# Patient Record
Sex: FEMALE | Race: WHITE | NOT HISPANIC OR LATINO | Employment: UNEMPLOYED | URBAN - NONMETROPOLITAN AREA
[De-identification: names, ages, dates, MRNs, and addresses within clinical notes are randomized per-mention and may not be internally consistent; named-entity substitution may affect disease eponyms.]

---

## 2023-04-21 ENCOUNTER — OFFICE VISIT (OUTPATIENT)
Dept: SURGERY | Facility: CLINIC | Age: 58
End: 2023-04-21
Payer: MEDICAID

## 2023-04-21 VITALS
WEIGHT: 146 LBS | HEIGHT: 62 IN | DIASTOLIC BLOOD PRESSURE: 88 MMHG | HEART RATE: 87 BPM | SYSTOLIC BLOOD PRESSURE: 124 MMHG | BODY MASS INDEX: 26.87 KG/M2

## 2023-04-21 DIAGNOSIS — Z12.11 SCREENING FOR COLON CANCER: Primary | ICD-10-CM

## 2023-04-21 RX ORDER — BISACODYL 5 MG/1
5 TABLET, DELAYED RELEASE ORAL TAKE AS DIRECTED
Qty: 4 TABLET | Refills: 0 | Status: SHIPPED | OUTPATIENT
Start: 2023-04-21 | End: 2024-04-20

## 2023-04-21 RX ORDER — POLYETHYLENE GLYCOL 3350 17 G/17G
POWDER, FOR SOLUTION ORAL
Qty: 238 G | Refills: 0 | Status: SHIPPED | OUTPATIENT
Start: 2023-04-21

## 2023-04-21 RX ORDER — FLUTICASONE PROPIONATE 50 MCG
SPRAY, SUSPENSION (ML) NASAL
COMMUNITY
Start: 2023-03-08

## 2023-04-21 RX ORDER — VENLAFAXINE HYDROCHLORIDE 37.5 MG/1
CAPSULE, EXTENDED RELEASE ORAL
COMMUNITY
Start: 2023-03-01

## 2023-04-21 RX ORDER — CETIRIZINE HYDROCHLORIDE 10 MG/1
TABLET ORAL
COMMUNITY
Start: 2023-04-12

## 2023-04-21 RX ORDER — GABAPENTIN 300 MG/1
CAPSULE ORAL
COMMUNITY
Start: 2023-03-15

## 2023-04-21 RX ORDER — CYCLOBENZAPRINE HCL 10 MG
TABLET ORAL
COMMUNITY
Start: 2023-04-05

## 2023-04-21 RX ORDER — CELECOXIB 200 MG/1
CAPSULE ORAL
COMMUNITY
Start: 2023-03-02

## 2023-04-21 NOTE — H&P (VIEW-ONLY)
Subjective   Rekha Jha is a 58 y.o. female who presents today for Initial Evaluation    Chief Complaint:    Chief Complaint   Patient presents with   • Colonoscopy        History of Present Illness:    History of Present Illness Rekha is a 58-year-old female who presents for screening colonoscopy.  She reports that she has not had a colonoscopy in the past.  States that she has a bowel movement once to twice daily to sometimes every 2 to 3 days.  Denies any visualization of blood in her stool.  Denies any known family history of colon cancer.  She denies any unintentional weight loss.  Reports that her stools range from diarrhea to solid stools.  She does not take any blood thinning medications.    The following portions of the patient's history were reviewed and updated as appropriate: allergies, current medications, past family history, past medical history, past social history, past surgical history and problem list.    Past Medical History:  Past Medical History:   Diagnosis Date   • Anemia    • Cholecystitis    • Degenerative disc disease, lumbar    • Hearing impaired    • Heart palpitations    • Liver cyst    • Ovarian cyst    • PONV (postoperative nausea and vomiting)    • Seizures     last time  2006   • Umbilical hernia        Social History:  Social History     Socioeconomic History   • Marital status:    Tobacco Use   • Smoking status: Every Day     Packs/day: 0.25     Years: 40.00     Pack years: 10.00     Types: Cigarettes   • Tobacco comments:     quit 2 months   Vaping Use   • Vaping Use: Never used   Substance and Sexual Activity   • Alcohol use: No     Comment: socially    • Drug use: No   • Sexual activity: Defer       Family History:  Family History   Problem Relation Age of Onset   • Hypertension Mother    • Cancer Maternal Aunt    • Cancer Maternal Grandmother        Past Surgical History:  Past Surgical History:   Procedure Laterality Date   • ABDOMINAL SURGERY     • BREAST  SURGERY      cyst removal left breast   •  SECTION     •  SECTION WITH TUBAL     • OR LAPAROSCOPY SURG CHOLECYSTECTOMY N/A 2016    Procedure: CHOLECYSTECTOMY LAPAROSCOPIC;  Surgeon: Benjamin Leon MD;  Location: [facility];  Service: General   • SALIVARY GLAND EXCISION     • TONSILLECTOMY         Problem List:  Patient Active Problem List   Diagnosis   • Cholecystitis       Allergy:   Allergies   Allergen Reactions   • Ciprofloxacin Hcl Hives        Current Medications:   Current Outpatient Medications   Medication Sig Dispense Refill   • celecoxib (CeleBREX) 200 MG capsule      • cetirizine (zyrTEC) 10 MG tablet      • cyclobenzaprine (FLEXERIL) 10 MG tablet      • fluticasone (FLONASE) 50 MCG/ACT nasal spray      • gabapentin (NEURONTIN) 300 MG capsule      • SALINE MIST 0.65 % nasal spray      • venlafaxine XR (EFFEXOR-XR) 37.5 MG 24 hr capsule      • bisacodyl (Dulcolax) 5 MG EC tablet Take 1 tablet by mouth Take As Directed. 4 tablet 0   • polyethylene glycol (MIRALAX) 17 GM/SCOOP powder Take as directed for colonosocpy. 238 g 0     No current facility-administered medications for this visit.       Review of Systems:    Review of Systems   Constitutional: Negative for activity change.   HENT: Negative for congestion.    Eyes: Negative for blurred vision.   Respiratory: Negative for shortness of breath.    Cardiovascular: Negative for chest pain.   Gastrointestinal: Negative for abdominal pain and blood in stool.   Endocrine: Negative for cold intolerance.   Genitourinary: Negative for flank pain.   Musculoskeletal: Negative for arthralgias.   Skin: Negative for bruise.   Allergic/Immunologic: Negative for environmental allergies.   Neurological: Negative for confusion.   Hematological: Negative for adenopathy.   Psychiatric/Behavioral: Negative for agitation.         Physical Exam:   Physical Exam  Constitutional:       Appearance: Normal appearance.   HENT:      Head: Normocephalic  "and atraumatic.      Right Ear: External ear normal.      Left Ear: External ear normal.   Eyes:      Conjunctiva/sclera: Conjunctivae normal.      Pupils: Pupils are equal, round, and reactive to light.   Cardiovascular:      Rate and Rhythm: Normal rate and regular rhythm.      Pulses: Normal pulses.      Heart sounds: Normal heart sounds.   Pulmonary:      Effort: Pulmonary effort is normal.      Breath sounds: Normal breath sounds.   Abdominal:      General: Abdomen is flat. Bowel sounds are normal.      Palpations: Abdomen is soft.   Musculoskeletal:         General: Normal range of motion.      Cervical back: Normal range of motion and neck supple.   Skin:     General: Skin is warm and dry.      Capillary Refill: Capillary refill takes less than 2 seconds.   Neurological:      General: No focal deficit present.      Mental Status: She is alert and oriented to person, place, and time.   Psychiatric:         Mood and Affect: Mood normal.         Behavior: Behavior normal.         Vitals:  Blood pressure 124/88, pulse 87, height 157.5 cm (62.01\"), weight 66.2 kg (146 lb), not currently breastfeeding.   Body mass index is 26.7 kg/m².         Assessment & Plan   Diagnoses and all orders for this visit:    1. Screening for colon cancer (Primary)  -     Case Request; Standing  -     Case Request    Other orders  -     Follow Anesthesia Guidelines / Protocol; Future  -     Obtain Informed Consent; Future  -     Provide NPO Instructions to Patient; Future  -     Chlorhexidine Skin Prep; Future  -     Follow Anesthesia Guidelines / Protocol; Standing  -     Verify / Perform Chlorhexidine Skin Prep; Standing  -     Verify / Perform Chlorhexidine Skin Prep if Indicated (If Not Already Completed); Standing  -     polyethylene glycol (MIRALAX) 17 GM/SCOOP powder; Take as directed for colonosocpy.  Dispense: 238 g; Refill: 0  -     bisacodyl (Dulcolax) 5 MG EC tablet; Take 1 tablet by mouth Take As Directed.  Dispense: 4 " tablet; Refill: 0      Rekha is a 58-year-old female presents for screening colonoscopy.  She will undergo colonoscopy with Dr. Schulte.  Verbalized understanding of prep instructions and procedure and wished to proceed.    Visit Diagnoses:    ICD-10-CM ICD-9-CM   1. Screening for colon cancer  Z12.11 V76.51         MEDS ORDERED DURING VISIT:  New Medications Ordered This Visit   Medications   • polyethylene glycol (MIRALAX) 17 GM/SCOOP powder     Sig: Take as directed for colonosocpy.     Dispense:  238 g     Refill:  0   • bisacodyl (Dulcolax) 5 MG EC tablet     Sig: Take 1 tablet by mouth Take As Directed.     Dispense:  4 tablet     Refill:  0       Return for Follow up post-op.             This document has been electronically signed by VITA Estrella  April 21, 2023 14:56 EDT    Please note that portions of this note were completed with a voice recognition program.

## 2023-04-21 NOTE — PROGRESS NOTES
Subjective   Rekha Jha is a 58 y.o. female who presents today for Initial Evaluation    Chief Complaint:    Chief Complaint   Patient presents with   • Colonoscopy        History of Present Illness:    History of Present Illness Rekha is a 58-year-old female who presents for screening colonoscopy.  She reports that she has not had a colonoscopy in the past.  States that she has a bowel movement once to twice daily to sometimes every 2 to 3 days.  Denies any visualization of blood in her stool.  Denies any known family history of colon cancer.  She denies any unintentional weight loss.  Reports that her stools range from diarrhea to solid stools.  She does not take any blood thinning medications.    The following portions of the patient's history were reviewed and updated as appropriate: allergies, current medications, past family history, past medical history, past social history, past surgical history and problem list.    Past Medical History:  Past Medical History:   Diagnosis Date   • Anemia    • Cholecystitis    • Degenerative disc disease, lumbar    • Hearing impaired    • Heart palpitations    • Liver cyst    • Ovarian cyst    • PONV (postoperative nausea and vomiting)    • Seizures     last time  2006   • Umbilical hernia        Social History:  Social History     Socioeconomic History   • Marital status:    Tobacco Use   • Smoking status: Every Day     Packs/day: 0.25     Years: 40.00     Pack years: 10.00     Types: Cigarettes   • Tobacco comments:     quit 2 months   Vaping Use   • Vaping Use: Never used   Substance and Sexual Activity   • Alcohol use: No     Comment: socially    • Drug use: No   • Sexual activity: Defer       Family History:  Family History   Problem Relation Age of Onset   • Hypertension Mother    • Cancer Maternal Aunt    • Cancer Maternal Grandmother        Past Surgical History:  Past Surgical History:   Procedure Laterality Date   • ABDOMINAL SURGERY     • BREAST  SURGERY      cyst removal left breast   •  SECTION     •  SECTION WITH TUBAL     • [place] LAPAROSCOPY SURG CHOLECYSTECTOMY N/A 2016    Procedure: CHOLECYSTECTOMY LAPAROSCOPIC;  Surgeon: Benjamin Leon MD;  Location: [facility];  Service: General   • SALIVARY GLAND EXCISION     • TONSILLECTOMY         Problem List:  Patient Active Problem List   Diagnosis   • Cholecystitis       Allergy:   Allergies   Allergen Reactions   • Ciprofloxacin Hcl Hives        Current Medications:   Current Outpatient Medications   Medication Sig Dispense Refill   • celecoxib (CeleBREX) 200 MG capsule      • cetirizine (zyrTEC) 10 MG tablet      • cyclobenzaprine (FLEXERIL) 10 MG tablet      • fluticasone (FLONASE) 50 MCG/ACT nasal spray      • gabapentin (NEURONTIN) 300 MG capsule      • SALINE MIST 0.65 % nasal spray      • venlafaxine XR (EFFEXOR-XR) 37.5 MG 24 hr capsule      • bisacodyl (Dulcolax) 5 MG EC tablet Take 1 tablet by mouth Take As Directed. 4 tablet 0   • polyethylene glycol (MIRALAX) 17 GM/SCOOP powder Take as directed for colonosocpy. 238 g 0     No current facility-administered medications for this visit.       Review of Systems:    Review of Systems   Constitutional: Negative for activity change.   HENT: Negative for congestion.    Eyes: Negative for blurred vision.   Respiratory: Negative for shortness of breath.    Cardiovascular: Negative for chest pain.   Gastrointestinal: Negative for abdominal pain and blood in stool.   Endocrine: Negative for cold intolerance.   Genitourinary: Negative for flank pain.   Musculoskeletal: Negative for arthralgias.   Skin: Negative for bruise.   Allergic/Immunologic: Negative for environmental allergies.   Neurological: Negative for confusion.   Hematological: Negative for adenopathy.   Psychiatric/Behavioral: Negative for agitation.         Physical Exam:   Physical Exam  Constitutional:       Appearance: Normal appearance.   HENT:      Head: Normocephalic  "and atraumatic.      Right Ear: External ear normal.      Left Ear: External ear normal.   Eyes:      Conjunctiva/sclera: Conjunctivae normal.      Pupils: Pupils are equal, round, and reactive to light.   Cardiovascular:      Rate and Rhythm: Normal rate and regular rhythm.      Pulses: Normal pulses.      Heart sounds: Normal heart sounds.   Pulmonary:      Effort: Pulmonary effort is normal.      Breath sounds: Normal breath sounds.   Abdominal:      General: Abdomen is flat. Bowel sounds are normal.      Palpations: Abdomen is soft.   Musculoskeletal:         General: Normal range of motion.      Cervical back: Normal range of motion and neck supple.   Skin:     General: Skin is warm and dry.      Capillary Refill: Capillary refill takes less than 2 seconds.   Neurological:      General: No focal deficit present.      Mental Status: She is alert and oriented to person, place, and time.   Psychiatric:         Mood and Affect: Mood normal.         Behavior: Behavior normal.         Vitals:  Blood pressure 124/88, pulse 87, height 157.5 cm (62.01\"), weight 66.2 kg (146 lb), not currently breastfeeding.   Body mass index is 26.7 kg/m².         Assessment & Plan   Diagnoses and all orders for this visit:    1. Screening for colon cancer (Primary)  -     Case Request; Standing  -     Case Request    Other orders  -     Follow Anesthesia Guidelines / Protocol; Future  -     Obtain Informed Consent; Future  -     Provide NPO Instructions to Patient; Future  -     Chlorhexidine Skin Prep; Future  -     Follow Anesthesia Guidelines / Protocol; Standing  -     Verify / Perform Chlorhexidine Skin Prep; Standing  -     Verify / Perform Chlorhexidine Skin Prep if Indicated (If Not Already Completed); Standing  -     polyethylene glycol (MIRALAX) 17 GM/SCOOP powder; Take as directed for colonosocpy.  Dispense: 238 g; Refill: 0  -     bisacodyl (Dulcolax) 5 MG EC tablet; Take 1 tablet by mouth Take As Directed.  Dispense: 4 " tablet; Refill: 0      Rekha is a 58-year-old female presents for screening colonoscopy.  She will undergo colonoscopy with Dr. Schulte.  Verbalized understanding of prep instructions and procedure and wished to proceed.    Visit Diagnoses:    ICD-10-CM ICD-9-CM   1. Screening for colon cancer  Z12.11 V76.51         MEDS ORDERED DURING VISIT:  New Medications Ordered This Visit   Medications   • polyethylene glycol (MIRALAX) 17 GM/SCOOP powder     Sig: Take as directed for colonosocpy.     Dispense:  238 g     Refill:  0   • bisacodyl (Dulcolax) 5 MG EC tablet     Sig: Take 1 tablet by mouth Take As Directed.     Dispense:  4 tablet     Refill:  0       Return for Follow up post-op.             This document has been electronically signed by VITA Estrella  April 21, 2023 14:56 EDT    Please note that portions of this note were completed with a voice recognition program.

## 2023-04-24 ENCOUNTER — TELEPHONE (OUTPATIENT)
Dept: SURGERY | Facility: CLINIC | Age: 58
End: 2023-04-24
Payer: MEDICAID

## 2023-04-24 PROBLEM — Z12.11 SCREENING FOR COLON CANCER: Status: ACTIVE | Noted: 2023-04-24

## 2023-04-24 NOTE — TELEPHONE ENCOUNTER
SPOKE TO PATIENT WITH SCHEDULE FOR HER COLONOSCOPY:    BOWEL PREP MEDICATIONS HAVE BEEN SENT TO [facility] IN [place], [place].    2 DAY BOWEL PREP, PATIENT HAS INSTRUCTIONS.  DAY 1:  WED:  04/26/23  DAY 2: THUR 04/27/23    NOTHING TO EAT OR DRINK AFTER MIDNIGHT ON THUR 04/27/23    COLONOSCOPY: FRI 04/28/23 ARRIVE AT 11:30 AM AT [facility].  PLEASE HAVE AN ADULT  WITH YOU TO DRIVE YOU HOME AFTER THE PROCEDURE.    IF YOU HAVE ANY QUESTIONS, PLEASE CALL OUR OFFICE -[phone].    PATIENT VERBALIZED UNDERSTANDING AND AGREEMENT OF THE ABOVE SCHEDULE, DATE, TIME, LOCATION, PREP, PROCESS.

## 2023-04-28 ENCOUNTER — HOSPITAL ENCOUNTER (OUTPATIENT)
Facility: HOSPITAL | Age: 58
Setting detail: HOSPITAL OUTPATIENT SURGERY
Discharge: HOME | End: 2023-04-28
Attending: SURGERY | Admitting: SURGERY
Payer: MEDICAID

## 2023-04-28 ENCOUNTER — ANESTHESIA (OUTPATIENT)
Dept: PERIOP | Facility: HOSPITAL | Age: 58
End: 2023-04-28
Payer: MEDICAID

## 2023-04-28 ENCOUNTER — ANESTHESIA EVENT (OUTPATIENT)
Dept: PERIOP | Facility: HOSPITAL | Age: 58
End: 2023-04-28
Payer: MEDICAID

## 2023-04-28 VITALS
HEART RATE: 73 BPM | TEMPERATURE: 97.1 F | DIASTOLIC BLOOD PRESSURE: 92 MMHG | RESPIRATION RATE: 18 BRPM | HEIGHT: 62 IN | SYSTOLIC BLOOD PRESSURE: 138 MMHG | OXYGEN SATURATION: 95 % | WEIGHT: 138 LBS | BODY MASS INDEX: 25.4 KG/M2

## 2023-04-28 PROCEDURE — 25010000002 MIDAZOLAM PER 1 MG: Performed by: NURSE ANESTHETIST, CERTIFIED REGISTERED

## 2023-04-28 PROCEDURE — 25010000002 PROPOFOL 200 MG/20ML EMULSION: Performed by: NURSE ANESTHETIST, CERTIFIED REGISTERED

## 2023-04-28 RX ORDER — KETOROLAC TROMETHAMINE 30 MG/ML
30 INJECTION, SOLUTION INTRAMUSCULAR; INTRAVENOUS EVERY 6 HOURS PRN
Status: DISCONTINUED | OUTPATIENT
Start: 2023-04-28 | End: 2023-04-28 | Stop reason: HOSPADM

## 2023-04-28 RX ORDER — LIDOCAINE HYDROCHLORIDE 20 MG/ML
INJECTION, SOLUTION EPIDURAL; INFILTRATION; INTRACAUDAL; PERINEURAL AS NEEDED
Status: DISCONTINUED | OUTPATIENT
Start: 2023-04-28 | End: 2023-04-28 | Stop reason: SURG

## 2023-04-28 RX ORDER — ONDANSETRON 2 MG/ML
4 INJECTION INTRAMUSCULAR; INTRAVENOUS AS NEEDED
Status: DISCONTINUED | OUTPATIENT
Start: 2023-04-28 | End: 2023-04-28 | Stop reason: HOSPADM

## 2023-04-28 RX ORDER — MIDAZOLAM HYDROCHLORIDE 1 MG/ML
1 INJECTION INTRAMUSCULAR; INTRAVENOUS
Status: DISCONTINUED | OUTPATIENT
Start: 2023-04-28 | End: 2023-04-28 | Stop reason: HOSPADM

## 2023-04-28 RX ORDER — MEPERIDINE HYDROCHLORIDE 25 MG/ML
12.5 INJECTION INTRAMUSCULAR; INTRAVENOUS; SUBCUTANEOUS
Status: DISCONTINUED | OUTPATIENT
Start: 2023-04-28 | End: 2023-04-28 | Stop reason: HOSPADM

## 2023-04-28 RX ORDER — SODIUM CHLORIDE, SODIUM LACTATE, POTASSIUM CHLORIDE, CALCIUM CHLORIDE 600; 310; 30; 20 MG/100ML; MG/100ML; MG/100ML; MG/100ML
100 INJECTION, SOLUTION INTRAVENOUS ONCE AS NEEDED
Status: DISCONTINUED | OUTPATIENT
Start: 2023-04-28 | End: 2023-04-28 | Stop reason: HOSPADM

## 2023-04-28 RX ORDER — PROPOFOL 10 MG/ML
INJECTION, EMULSION INTRAVENOUS AS NEEDED
Status: DISCONTINUED | OUTPATIENT
Start: 2023-04-28 | End: 2023-04-28 | Stop reason: SURG

## 2023-04-28 RX ORDER — OXYCODONE HYDROCHLORIDE AND ACETAMINOPHEN 5; 325 MG/1; MG/1
1 TABLET ORAL ONCE AS NEEDED
Status: DISCONTINUED | OUTPATIENT
Start: 2023-04-28 | End: 2023-04-28 | Stop reason: HOSPADM

## 2023-04-28 RX ORDER — SODIUM CHLORIDE, SODIUM LACTATE, POTASSIUM CHLORIDE, CALCIUM CHLORIDE 600; 310; 30; 20 MG/100ML; MG/100ML; MG/100ML; MG/100ML
125 INJECTION, SOLUTION INTRAVENOUS ONCE
Status: COMPLETED | OUTPATIENT
Start: 2023-04-28 | End: 2023-04-28

## 2023-04-28 RX ORDER — IPRATROPIUM BROMIDE AND ALBUTEROL SULFATE 2.5; .5 MG/3ML; MG/3ML
3 SOLUTION RESPIRATORY (INHALATION) ONCE AS NEEDED
Status: DISCONTINUED | OUTPATIENT
Start: 2023-04-28 | End: 2023-04-28 | Stop reason: HOSPADM

## 2023-04-28 RX ORDER — SODIUM CHLORIDE 0.9 % (FLUSH) 0.9 %
10 SYRINGE (ML) INJECTION AS NEEDED
Status: DISCONTINUED | OUTPATIENT
Start: 2023-04-28 | End: 2023-04-28 | Stop reason: HOSPADM

## 2023-04-28 RX ORDER — SODIUM CHLORIDE 0.9 % (FLUSH) 0.9 %
10 SYRINGE (ML) INJECTION EVERY 12 HOURS SCHEDULED
Status: DISCONTINUED | OUTPATIENT
Start: 2023-04-28 | End: 2023-04-28 | Stop reason: HOSPADM

## 2023-04-28 RX ORDER — MIDAZOLAM HYDROCHLORIDE 1 MG/ML
INJECTION INTRAMUSCULAR; INTRAVENOUS AS NEEDED
Status: DISCONTINUED | OUTPATIENT
Start: 2023-04-28 | End: 2023-04-28 | Stop reason: SURG

## 2023-04-28 RX ORDER — FENTANYL CITRATE 50 UG/ML
50 INJECTION, SOLUTION INTRAMUSCULAR; INTRAVENOUS
Status: DISCONTINUED | OUTPATIENT
Start: 2023-04-28 | End: 2023-04-28 | Stop reason: HOSPADM

## 2023-04-28 RX ORDER — SODIUM CHLORIDE 9 MG/ML
40 INJECTION, SOLUTION INTRAVENOUS AS NEEDED
Status: DISCONTINUED | OUTPATIENT
Start: 2023-04-28 | End: 2023-04-28 | Stop reason: HOSPADM

## 2023-04-28 RX ADMIN — MIDAZOLAM HYDROCHLORIDE 2 MG: 1 INJECTION, SOLUTION INTRAMUSCULAR; INTRAVENOUS at 12:17

## 2023-04-28 RX ADMIN — PROPOFOL 50 MG: 10 INJECTION, EMULSION INTRAVENOUS at 12:22

## 2023-04-28 RX ADMIN — SODIUM CHLORIDE, POTASSIUM CHLORIDE, SODIUM LACTATE AND CALCIUM CHLORIDE: 600; 310; 30; 20 INJECTION, SOLUTION INTRAVENOUS at 12:17

## 2023-04-28 RX ADMIN — PROPOFOL 100 MG: 10 INJECTION, EMULSION INTRAVENOUS at 12:18

## 2023-04-28 RX ADMIN — PROPOFOL 50 MG: 10 INJECTION, EMULSION INTRAVENOUS at 12:32

## 2023-04-28 RX ADMIN — PROPOFOL 50 MG: 10 INJECTION, EMULSION INTRAVENOUS at 12:27

## 2023-04-28 RX ADMIN — LIDOCAINE HYDROCHLORIDE 60 MG: 20 INJECTION, SOLUTION EPIDURAL; INFILTRATION; INTRACAUDAL; PERINEURAL at 12:18

## 2023-04-28 NOTE — ANESTHESIA PREPROCEDURE EVALUATION
Anesthesia Evaluation     history of anesthetic complications: PONV  NPO Solid Status: > 8 hours  NPO Liquid Status: > 8 hours           Airway   Mallampati: II  TM distance: >3 FB  Neck ROM: full  No difficulty expected  Dental - normal exam   (+) poor dentition    Pulmonary - normal exam   (+) a smoker Current Smoked day of surgery,   Cardiovascular - normal exam        Neuro/Psych  (+) seizures,    GI/Hepatic/Renal/Endo    (+)   liver disease,     Musculoskeletal     Abdominal  - normal exam    Bowel sounds: normal.   Substance History      OB/GYN          Other                        Anesthesia Plan    ASA 3     general     intravenous induction     Anesthetic plan, risks, benefits, and alternatives have been provided, discussed and informed consent has been obtained with: patient.        CODE STATUS:

## 2023-04-28 NOTE — ANESTHESIA POSTPROCEDURE EVALUATION
Patient: Rekha Jha    Procedure Summary     Date: 04/28/23 Room / Location: [facility] OR  /  [facility] OR    Anesthesia Start: 1217 Anesthesia Stop: 1234    Procedure: COLONOSCOPY Diagnosis:       Screening for colon cancer      (Screening for colon cancer [Z12.11])    Surgeons: Domenic Schulte MD Provider: Shahzad Aamdo MD    Anesthesia Type: general ASA Status: 3          Anesthesia Type: general    Vitals  No vitals data found for the desired time range.          Post Anesthesia Care and Evaluation    Patient location during evaluation: PHASE II  Patient participation: complete - patient participated  Level of consciousness: awake and alert  Pain score: 1  Pain management: adequate    Airway patency: patent  Anesthetic complications: No anesthetic complications  PONV Status: controlled  Cardiovascular status: acceptable  Respiratory status: acceptable  Hydration status: acceptable

## 2024-06-18 ENCOUNTER — TRANSCRIBE ORDERS (OUTPATIENT)
Dept: ADMINISTRATIVE | Facility: HOSPITAL | Age: 59
End: 2024-06-18
Payer: MEDICAID

## 2024-06-18 DIAGNOSIS — M54.50 LEFT-SIDED LOW BACK PAIN WITHOUT SCIATICA, UNSPECIFIED CHRONICITY: Primary | ICD-10-CM

## 2024-06-29 ENCOUNTER — HOSPITAL ENCOUNTER (OUTPATIENT)
Dept: MRI IMAGING | Facility: HOSPITAL | Age: 59
Discharge: HOME | End: 2024-06-29
Payer: MEDICAID

## 2024-06-29 DIAGNOSIS — M54.50 LEFT-SIDED LOW BACK PAIN WITHOUT SCIATICA, UNSPECIFIED CHRONICITY: ICD-10-CM

## 2024-06-29 PROCEDURE — 72148 MRI LUMBAR SPINE W/O DYE: CPT

## 2024-12-02 ENCOUNTER — OFFICE VISIT (OUTPATIENT)
Dept: PULMONOLOGY | Facility: CLINIC | Age: 59
End: 2024-12-02
Payer: MEDICAID

## 2024-12-02 VITALS
BODY MASS INDEX: 29.44 KG/M2 | HEIGHT: 62 IN | SYSTOLIC BLOOD PRESSURE: 130 MMHG | HEART RATE: 89 BPM | OXYGEN SATURATION: 97 % | TEMPERATURE: 98.1 F | DIASTOLIC BLOOD PRESSURE: 76 MMHG | WEIGHT: 160 LBS

## 2024-12-02 DIAGNOSIS — F17.200 SMOKER: ICD-10-CM

## 2024-12-02 DIAGNOSIS — G47.33 OSA (OBSTRUCTIVE SLEEP APNEA): Primary | ICD-10-CM

## 2024-12-02 PROCEDURE — 99203 OFFICE O/P NEW LOW 30 MIN: CPT | Performed by: INTERNAL MEDICINE

## 2024-12-02 PROCEDURE — 1160F RVW MEDS BY RX/DR IN RCRD: CPT | Performed by: INTERNAL MEDICINE

## 2024-12-02 PROCEDURE — 1159F MED LIST DOCD IN RCRD: CPT | Performed by: INTERNAL MEDICINE

## 2024-12-02 NOTE — PROGRESS NOTES
Chief Complaint  Snoring and Fatigue    Rekha Jha is a 59 y.o. female who presents today to [facility] for Snoring and Fatigue.    HPI:   Patient is a very pleasant 59-year-old female referred to me for further evaluation of sleep disordered breathing/snoring and unrefreshing sleep.    Patient reported loud snoring, fragmented sleep, morning headache and excessive daytime sleepiness.  Sleepiness score is 9.    She goes to bed at around 9 and it takes about an hour a few before she would fall asleep.  She wakes up in the middle of the night and watch television for an hour or so.    She finally wakes up at 7:00 in the morning and feels unrefreshed.    She denies sleep attack, cataplexy, hypnagogic hallucination and sleep paralysis.    She reported chronic sinus congestion.    He denies chest pain, orthopnea, PND, leg edema, nausea, vomiting, diarrhea, hemoptysis, hematemesis, melena, bright red blood per rectum.    She denies shortness of breath on exertion.        Previous History:   Past Medical History:   Diagnosis Date    Anemia     Cholecystitis     Degenerative disc disease, lumbar     Hearing impaired     Heart palpitations     Liver cyst     Ovarian cyst     PONV (postoperative nausea and vomiting)     Seizures     last time      Umbilical hernia       Past Surgical History:   Procedure Laterality Date    ABDOMINAL SURGERY      BREAST SURGERY      cyst removal left breast     SECTION       SECTION WITH TUBAL      COLONOSCOPY N/A 2023    Procedure: COLONOSCOPY;  Surgeon: Domenic Schulte MD;  Location: [facility];  Service: Gastroenterology;  Laterality: N/A;    DC LAPAROSCOPY SURG CHOLECYSTECTOMY N/A 2016    Procedure: CHOLECYSTECTOMY LAPAROSCOPIC;  Surgeon: Benjamin Leon MD;  Location: [facility] OR;  Service: General    SALIVARY GLAND EXCISION      TONSILLECTOMY        Social History     Socioeconomic History     "Marital status:    Tobacco Use    Smoking status: Every Day     Current packs/day: 0.25     Average packs/day: 0.3 packs/day for 40.0 years (10.0 ttl pk-yrs)     Types: Cigarettes    Smokeless tobacco: Never    Tobacco comments:     quit 2 months   Vaping Use    Vaping status: Never Used   Substance and Sexual Activity    Alcohol use: No     Comment: socially     Drug use: No    Sexual activity: Defer        Current Medications:  Current Outpatient Medications   Medication Sig Dispense Refill    celecoxib (CeleBREX) 200 MG capsule       cetirizine (zyrTEC) 10 MG tablet       cyclobenzaprine (FLEXERIL) 10 MG tablet       fluticasone (FLONASE) 50 MCG/ACT nasal spray       gabapentin (NEURONTIN) 300 MG capsule       SALINE MIST 0.65 % nasal spray       venlafaxine XR (EFFEXOR-XR) 37.5 MG 24 hr capsule        No current facility-administered medications for this visit.       Allergies:   Allergies   Allergen Reactions    Ciprofloxacin Hcl Hives       Vitals:   /76   Pulse 89   Temp 98.1 °F (36.7 °C)   Ht 157.5 cm (62\")   Wt 72.6 kg (160 lb)   LMP 07/28/2016 (Approximate)   SpO2 97%   BMI 29.26 kg/m²     Estimated body mass index is 29.26 kg/m² as calculated from the following:    Height as of this encounter: 157.5 cm (62\").    Weight as of this encounter: 72.6 kg (160 lb).    Rekha Jha  reports that she has been smoking cigarettes. She has a 10 pack-year smoking history. She has never used smokeless tobacco. I have educated her on the risk of diseases from using tobacco products such as cancer, COPD, and heart disease.     I advised her to quit and she is not willing to quit.    I spent 10 minutes counseling the patient.           Physical Exam:   Physical Exam  Vitals reviewed.   Constitutional:       Appearance: Normal appearance. She is obese.      Interventions: She is not intubated.  HENT:      Head: Normocephalic.      Nose: Nose normal.      Mouth/Throat:      Mouth: Mucous membranes " are moist.      Comments: Moderate oropharyngeal crowding.  Bilateral nasal congestion  Eyes:      Extraocular Movements: Extraocular movements intact.      Conjunctiva/sclera: Conjunctivae normal.      Pupils: Pupils are equal, round, and reactive to light.   Cardiovascular:      Rate and Rhythm: Normal rate.      Heart sounds: No murmur heard.     No friction rub. No gallop.   Pulmonary:      Effort: Pulmonary effort is normal. No tachypnea, bradypnea, accessory muscle usage, prolonged expiration, respiratory distress or retractions. She is not intubated.      Breath sounds: Normal breath sounds. No stridor, decreased air movement or transmitted upper airway sounds. No wheezing or rales.   Abdominal:      General: There is no distension.      Palpations: Abdomen is soft. There is no mass.      Tenderness: There is no abdominal tenderness. There is no right CVA tenderness, left CVA tenderness, guarding or rebound.      Hernia: No hernia is present.   Skin:     Coloration: Skin is not jaundiced.      Findings: No erythema.   Neurological:      General: No focal deficit present.      Mental Status: She is alert and oriented to person, place, and time.   Psychiatric:         Mood and Affect: Mood normal.          Procedures     STOP-Bang Score:     [place] Sleepiness Scale: [place] Sleepiness Scale  Sitting and reading: Moderate chance of dozing  Watching TV: Moderate chance of dozing  Sitting, inactive in a public place (e.g. a theatre or a meeting): Would never doze  As a passenger in a car for an hour without a break: Would never doze  Lying down to rest in the afternoon when circumstances permit: High chance of dozing  Sitting and talking to someone: Would never doze  Sitting quietly after a lunch without alcohol: Moderate chance of dozing  In a car, while stopped for a few minutes in traffic: Would never doze  Total score: 9     Lab Results:   No visits with results within 3 Month(s) from this visit.   Latest  "known visit with results is:   Admission on 08/18/2016, Discharged on 08/18/2016   Component Date Value Ref Range Status    Case Report 08/18/2016    Final                    Value:Surgical Pathology Report                         Case: [id number]                                  Authorizing Provider:  Benjamin Leon MD    Collected:           08/18/2016 11:09 AM          Ordering Location:     [facility]      Received:            08/18/2016 01:22 PM                                 OPERATING ROOM DEPARTMENT                                                    Pathologist:           Nelly Staples MD                                                        Specimen:    Gallbladder                                                                                Final Diagnosis 08/18/2016    Final                    Value:ssr       Lab Results (last 72 hours)       ** No results found for the last 72 hours. **          WBC No results found for: \"WBC\"   HGB No results found for: \"HGB\"   HCT No results found for: \"HCT\"   Platlets No results found for: \"LABPLAT\"     PT/INR:  No results found for: \"PROTIME\"/No results found for: \"INR\"    Sodium No results found for: \"NA\"   Potassium No results found for: \"K\"   Chloride No results found for: \"CL\"   Bicarbonate No results found for: \"PLASMABICARB\"   BUN No results found for: \"BUN\"   Creatinine No results found for: \"CREATININE\"   Calcium No results found for: \"CALCIUM\"   Magnesium No results found for: \"MG\"     pH No results found for: \"PHART\"   pO2 No results found for: \"PO2ART\"   pCO2 No results found for: \"ESL0LCM\"   HCO3 No results found for: \"XZW7GOY\"           Radiology Review:   No results found for this or any previous visit.     No results found for this or any previous visit.    No results found for this or any previous visit.    No results found for this or any previous visit.    No results found for this or any previous visit.    No results found " for this or any previous visit.     No results found for this or any previous visit.    No results found for this or any previous visit.    No results found for this or any previous visit.                  Results Review: I reviewed the patient's new clinical results.    Assessment and Plan    Visit Diagnosis:     ICD-10-CM ICD-9-CM   1. JASON (obstructive sleep apnea)  G47.33 327.23   I clinical suspicion for sleep apnea.  Will proceed with home sleep study.  Reassess after home sleep study.    Counseling for smoking cessation was done.        New Medications:   No orders of the defined types were placed in this encounter.      Discontinued Medications:   There are no discontinued medications.         Follow Up:       Patient was given instructions and counseling regarding her condition. Please see specific information pulled into the AVS if appropriate.       This document has been electronically signed by Fletcher Snow MD   December 2, 2024 11:16 EST    Dictated Utilizing Dragon Dictation: Part of this note may be an electronic transcription/translation of spoken language to printed text using the Dragon Dictation System.

## 2024-12-23 ENCOUNTER — OFFICE VISIT (OUTPATIENT)
Dept: ORTHOPEDIC SURGERY | Facility: CLINIC | Age: 59
End: 2024-12-23
Payer: MEDICAID

## 2024-12-23 ENCOUNTER — HOSPITAL ENCOUNTER (OUTPATIENT)
Dept: GENERAL RADIOLOGY | Facility: HOSPITAL | Age: 59
Discharge: HOME | End: 2024-12-23
Admitting: PHYSICIAN ASSISTANT
Payer: MEDICAID

## 2024-12-23 VITALS
DIASTOLIC BLOOD PRESSURE: 75 MMHG | SYSTOLIC BLOOD PRESSURE: 118 MMHG | HEART RATE: 71 BPM | WEIGHT: 160 LBS | BODY MASS INDEX: 29.44 KG/M2 | HEIGHT: 62 IN

## 2024-12-23 DIAGNOSIS — M25.531 BILATERAL WRIST PAIN: Primary | ICD-10-CM

## 2024-12-23 DIAGNOSIS — M25.532 BILATERAL WRIST PAIN: Primary | ICD-10-CM

## 2024-12-23 DIAGNOSIS — M25.532 BILATERAL WRIST PAIN: ICD-10-CM

## 2024-12-23 DIAGNOSIS — M25.531 BILATERAL WRIST PAIN: ICD-10-CM

## 2024-12-23 PROCEDURE — 73110 X-RAY EXAM OF WRIST: CPT

## 2024-12-23 PROCEDURE — 73110 X-RAY EXAM OF WRIST: CPT | Performed by: RADIOLOGY

## 2024-12-23 PROCEDURE — 99203 OFFICE O/P NEW LOW 30 MIN: CPT | Performed by: PHYSICIAN ASSISTANT

## 2024-12-23 RX ORDER — PANTOPRAZOLE SODIUM 40 MG/1
TABLET, DELAYED RELEASE ORAL
COMMUNITY
Start: 2024-12-17

## 2024-12-23 RX ORDER — DULOXETIN HYDROCHLORIDE 30 MG/1
CAPSULE, DELAYED RELEASE ORAL
COMMUNITY
Start: 2024-12-17

## 2024-12-23 NOTE — PROGRESS NOTES
[facility] Orthopaedic Surgery New Patient Visit          Patient: Rekha Jha  YOB: 1965  Date of Encounter: 2024  PCP: Katheryn Taylor DO      Subjective     Chief Complaint   Patient presents with    Right Wrist - Initial Evaluation, Pain, Numbness    Left Wrist - Initial Evaluation, Pain, Numbness           History of Present Illness:     Rekha Jha is a 59 y.o. female presents today as result of bilateral hand pain and numbness first 3 digits bilaterally.  The patient reports numbness and tingling as well as  strength loss.  She has difficulty upon normal daily activities as result of this.  She has undergone EMG/nerve conduction studies to which she presents today for results review.  Patient has undergone no bracing or medication.  She has been implementing activity modification with no alleviation.        Patient Active Problem List   Diagnosis    Cholecystitis    Screening for colon cancer     Past Medical History:   Diagnosis Date    Anemia     Cholecystitis     Degenerative disc disease, lumbar     Hearing impaired     Heart palpitations     Liver cyst     Patient states she hasnt been told    Ovarian cyst     PONV (postoperative nausea and vomiting)     Seizures     last time      Umbilical hernia      Past Surgical History:   Procedure Laterality Date    ABDOMINAL SURGERY      BREAST SURGERY      cyst removal left breast     SECTION       SECTION WITH TUBAL      COLONOSCOPY N/A 2023    Procedure: COLONOSCOPY;  Surgeon: Domenic Schulte MD;  Location: [facility] OR;  Service: Gastroenterology;  Laterality: N/A;    PA LAPAROSCOPY SURG CHOLECYSTECTOMY N/A 2016    Procedure: CHOLECYSTECTOMY LAPAROSCOPIC;  Surgeon: Benjamin Leon MD;  Location: [facility] OR;  Service: General    SALIVARY GLAND EXCISION      TONSILLECTOMY       Social History     Occupational History    Not on file   Tobacco Use    Smoking status: Every Day     Current  packs/day: 0.25     Average packs/day: 0.3 packs/day for 40.0 years (10.0 ttl pk-yrs)     Types: Cigarettes    Smokeless tobacco: Never    Tobacco comments:     quit 2 months   Vaping Use    Vaping status: Never Used   Substance and Sexual Activity    Alcohol use: No     Comment: socially     Drug use: No    Sexual activity: Defer    Rekha Jha  reports that she has been smoking cigarettes. She has a 10 pack-year smoking history. She has never used smokeless tobacco. I have educated her on the risk of diseases from using tobacco products such as cancer, COPD, and heart disease.     I advised her to quit and she is not willing to quit.    I spent 3  minutes counseling the patient.          Social History     Social History Narrative    Not on file     Family History   Adopted: Yes   Problem Relation Age of Onset    Hypertension Mother     Cancer Maternal Aunt     Cancer Maternal Grandmother      Current Outpatient Medications   Medication Sig Dispense Refill    celecoxib (CeleBREX) 200 MG capsule       cetirizine (zyrTEC) 10 MG tablet       DULoxetine (CYMBALTA) 30 MG capsule       fluticasone (FLONASE) 50 MCG/ACT nasal spray       gabapentin (NEURONTIN) 800 MG tablet Take 1 tablet by mouth Daily.      pantoprazole (PROTONIX) 40 MG EC tablet       SALINE MIST 0.65 % nasal spray       cyclobenzaprine (FLEXERIL) 10 MG tablet  (Patient not taking: Reported on 12/23/2024)      venlafaxine XR (EFFEXOR-XR) 37.5 MG 24 hr capsule  (Patient not taking: Reported on 12/23/2024)       No current facility-administered medications for this visit.     Allergies   Allergen Reactions    Ciprofloxacin Hcl Hives            Review of Systems   Constitutional: Negative.   HENT:  Positive for hearing loss.    Eyes: Negative.    Cardiovascular: Negative.    Respiratory: Negative.     Endocrine: Negative.    Hematologic/Lymphatic: Bruises/bleeds easily.   Skin: Negative.    Musculoskeletal:  Positive for back pain, joint pain, joint  "swelling and neck pain.        Pertinent positives listed in HPI   Gastrointestinal:  Positive for nausea and vomiting.   Genitourinary: Negative.    Neurological:  Positive for numbness.   Psychiatric/Behavioral:  Positive for depression.    Allergic/Immunologic: Negative.          Objective      Vitals:    12/23/24 1044   BP: 118/75   Pulse: 71   Weight: 72.6 kg (160 lb)   Height: 157.5 cm (62\")      BMI is >= 25 and <30. (Overweight) The following options were offered after discussion;: exercise counseling/recommendations and nutrition counseling/recommendations      Physical Exam  Vitals and nursing note reviewed.   Constitutional:       General: She is not in acute distress.     Appearance: She is not ill-appearing.   HENT:      Head: Normocephalic and atraumatic.      Right Ear: External ear normal.      Left Ear: External ear normal.      Nose: Nose normal. No congestion or rhinorrhea.   Eyes:      Extraocular Movements: Extraocular movements intact.      Conjunctiva/sclera: Conjunctivae normal.      Pupils: Pupils are equal, round, and reactive to light.   Cardiovascular:      Rate and Rhythm: Normal rate.      Pulses: Normal pulses.   Pulmonary:      Effort: Pulmonary effort is normal. No respiratory distress.      Breath sounds: No stridor.   Abdominal:      General: There is no distension.   Musculoskeletal:      Cervical back: Normal range of motion.      Comments: Examination today patient's bilateral upper extremities reveals hand numbness and altered sensation first 3 digits with positive Phalen's and Tinel sign.  No significant thenar atrophy.  No loss of intrinsic musculature.  Neurovascular status grossly intact bilateral upper extremities   Skin:     General: Skin is warm and dry.      Capillary Refill: Capillary refill takes less than 2 seconds.   Neurological:      General: No focal deficit present.      Mental Status: She is alert and oriented to person, place, and time.   Psychiatric:         " Mood and Affect: Mood normal.         Behavior: Behavior normal.         Thought Content: Thought content normal.         Judgment: Judgment normal.                 Radiology:        XR Wrist 3+ View Bilateral    Result Date: 12/23/2024  As Above.      This report was finalized on 12/23/2024 10:58 AM by Dr. Fredi Ibrahim MD.           Assessment/Plan        ICD-10-CM ICD-9-CM   1. Bilateral wrist pain  M25.531 719.43    M25.532      59-year-old female with notable bilateral hand numbness and tingling consistent and concerning for carpal tunnel syndrome.  Previous EMG and nerve conduction studies reveals abnormal study consistent with mild sensorimotor median nerve impingement consistent with carpal tunnel syndrome.  Very mild affecting the left.  Secondary to the notable findings consistent with carpal tunnel syndrome bilaterally the patient was provided today with bilateral cock-up wrist braces for nighttime splinting.  The patient was instructed to return back in 3 weeks for further evaluation of the efficacy of the conservative treatment options.                        This document was signed by Yosef Lucio PA-C December 23, 2024    CC: Katheryn Taylor DO      Dictated Utilizing Dragon Dictation:   Please note that portions of this note were completed with a voice recognition program.   Part of this note may be an electronic transcription/translation of spoken language to printed text using the Dragon Dictation System.

## 2025-01-02 DIAGNOSIS — G47.33 OSA (OBSTRUCTIVE SLEEP APNEA): Primary | ICD-10-CM

## 2025-01-10 DIAGNOSIS — G47.33 OSA (OBSTRUCTIVE SLEEP APNEA): Primary | ICD-10-CM

## 2025-01-14 ENCOUNTER — OFFICE VISIT (OUTPATIENT)
Dept: ORTHOPEDIC SURGERY | Facility: CLINIC | Age: 60
End: 2025-01-14
Payer: MEDICAID

## 2025-01-14 VITALS — WEIGHT: 160.05 LBS | HEIGHT: 62 IN | BODY MASS INDEX: 29.45 KG/M2

## 2025-01-14 DIAGNOSIS — G56.00 CARPAL TUNNEL SYNDROME, UNSPECIFIED LATERALITY: ICD-10-CM

## 2025-01-14 DIAGNOSIS — M25.531 BILATERAL WRIST PAIN: Primary | ICD-10-CM

## 2025-01-14 DIAGNOSIS — M25.532 BILATERAL WRIST PAIN: Primary | ICD-10-CM

## 2025-01-14 PROCEDURE — 99213 OFFICE O/P EST LOW 20 MIN: CPT | Performed by: PHYSICIAN ASSISTANT

## 2025-02-04 ENCOUNTER — OFFICE VISIT (OUTPATIENT)
Dept: PULMONOLOGY | Facility: CLINIC | Age: 60
End: 2025-02-04
Payer: MEDICAID

## 2025-02-04 VITALS
SYSTOLIC BLOOD PRESSURE: 122 MMHG | WEIGHT: 157.4 LBS | BODY MASS INDEX: 27.89 KG/M2 | OXYGEN SATURATION: 94 % | HEART RATE: 77 BPM | HEIGHT: 63 IN | TEMPERATURE: 97.5 F | DIASTOLIC BLOOD PRESSURE: 82 MMHG

## 2025-02-04 DIAGNOSIS — G47.33 OSA (OBSTRUCTIVE SLEEP APNEA): Primary | ICD-10-CM

## 2025-02-04 DIAGNOSIS — F17.200 SMOKER: ICD-10-CM

## 2025-02-04 PROCEDURE — 99213 OFFICE O/P EST LOW 20 MIN: CPT | Performed by: NURSE PRACTITIONER

## 2025-02-04 PROCEDURE — 1159F MED LIST DOCD IN RCRD: CPT | Performed by: NURSE PRACTITIONER

## 2025-02-04 PROCEDURE — 1160F RVW MEDS BY RX/DR IN RCRD: CPT | Performed by: NURSE PRACTITIONER

## 2025-02-04 RX ORDER — MONTELUKAST SODIUM 10 MG/1
TABLET ORAL DAILY
COMMUNITY

## 2025-02-04 NOTE — PROGRESS NOTES
"Chief Complaint  Sleep Apnea    Subjective          Rekha Oren Jha presents to [facility]ULMON[facility]ARE [facility]ICINE for   History of Present Illness    Ms. Jha is a 60 year old female with a medical history significant for anemia, seizures, and sleep apnea.    She presents today for follow up on sleep apnea.  She reports that she got set up with her cpap on Friday.  She reports that she has used it a few times and feels that she is doing okay with it.  She does tell me that she keeps pulling her mask off in the middle of the night.  She tells me that she has noticed that it does help her some.  She states that she is not falling asleep while driving and is not as sleepy during the day.  She is a current smoker.      Objective   Vital Signs:   /82   Pulse 77   Temp 97.5 °F (36.4 °C)   Ht 158.8 cm (62.5\")   Wt 71.4 kg (157 lb 6.4 oz)   SpO2 94%   BMI 28.33 kg/m²         Physical Exam    GENERAL APPEARANCE: Well developed, well nourished, alert and cooperative, and appears to be in no acute distress.    HEAD: normocephalic. Atraumatic.    EYES: PERRL, EOMI. Vision is grossly intact.    THROAT: Oral cavity and pharynx normal. No inflammation, swelling, exudate, or lesions.     NECK: Neck supple.  No thyromegaly.    CARDIAC: Normal S1 and S2. No S3, S4 or murmurs. Rhythm is regular.     RESPIRATORY:Bilateral air entry positive.  No wheezing, crackles or rhonchi noted.    GI: Positive bowel sounds. Soft, nondistended, nontender.     MUSCULOSKELETAL: No significant deformity or joint abnormality. No edema. Peripheral pulses intact. No varicosities.    NEUROLOGICAL: Strength and sensation symmetric and intact throughout.     PSYCHIATRIC: The mental examination revealed the patient was oriented to person, place, and time.       Estimated body mass index is 28.33 kg/m² as calculated from the following:    Height as of this encounter: 158.8 cm (62.5\").    Weight as of this " "encounter: 71.4 kg (157 lb 6.4 oz).        Result Review :   The following data was reviewed by: VITA Chun on 02/04/2025:         PFT:RE    Low dose lung cancer screening:NA    Previous chest imaging:NA    Alpha-1 antitrypsin screening:NA    STOP-Bang Score:   NA  [place] Sleepiness Scale:   NA      ABG:    pH No results found for: \"PHART\"   pO2 No results found for: \"PO2ART\"   pCO2 No results found for: \"VZH8KEF\"   HCO3 No results found for: \"MAR0ECU\"                      Assessment and Plan    Problem List Items Addressed This Visit    None  Visit Diagnoses       JASON (obstructive sleep apnea)    -  Primary    Smoker              Rekha Jha  reports that she has been smoking cigarettes. She has a 10 pack-year smoking history. She has been exposed to tobacco smoke. She has never used smokeless tobacco. I have educated her on the risk of diseases from using tobacco products such as cancer, COPD, and heart disease.     I advised her to quit and she is not willing to quit.      Home sleep study was notable for mild sleep apnea.  AHI 7.7.  She was set up with a cpap. She is using it nightly. She does tell me that she is pulling it off in the middle of the night.   She noted benefit from use.     Patient was educated on positive airway pressure treatment.  As per CMS guidelines, more than 4 hours on 70% of observed nights is considered adherence. Patient was strongly encouraged to use CPAP as much as possible during sleep as more CPAP use is equal to more benefit. Use of heated humidification in positive airway pressure treatment to improve the adherence to the device.  In case of claustrophobia, we will provide the patient cognitive behavioral therapy and desensitization. Oral appliances use will be discussed with the patient in case of mild to moderate sleep apnea or if the patient with severe disease fail positive airway pressure treatment.       The patient was extensively educated on the " consequences of untreated obstructive sleep apnea namely cardiovascular/metabolic disorder, neurocognitive deficit, daytime sleepiness, motor vehicle accidents, depression, mood disorders and reduced quality of life.  At the end of conversation, the patient voices understanding of the disease process and treatment modality.  Patient also understands the risk of untreated obstructive sleep apnea and benefit benefits of the treatment.    Counseling time was greater than 10 minutes.      I will see her back in  a few months with a compliance report.      Follow Up   Return in about 2 months (around 4/4/2025).  Patient was given instructions and counseling regarding her condition or for health maintenance advice. Please see specific information pulled into the AVS if appropriate.

## 2025-02-28 ENCOUNTER — TRANSCRIBE ORDERS (OUTPATIENT)
Dept: ADMINISTRATIVE | Facility: HOSPITAL | Age: 60
End: 2025-02-28
Payer: MEDICAID

## 2025-02-28 DIAGNOSIS — R59.9 SWELLING OF LYMPH NODES: Primary | ICD-10-CM

## 2025-03-05 ENCOUNTER — HOSPITAL ENCOUNTER (OUTPATIENT)
Dept: ULTRASOUND IMAGING | Facility: HOSPITAL | Age: 60
Discharge: HOME | End: 2025-03-05
Admitting: FAMILY MEDICINE
Payer: MEDICAID

## 2025-03-05 DIAGNOSIS — R59.9 SWELLING OF LYMPH NODES: ICD-10-CM

## 2025-03-05 PROCEDURE — 76536 US EXAM OF HEAD AND NECK: CPT

## 2025-04-17 ENCOUNTER — OFFICE VISIT (OUTPATIENT)
Dept: GASTROENTEROLOGY | Facility: CLINIC | Age: 60
End: 2025-04-17
Payer: MEDICAID

## 2025-04-17 VITALS
SYSTOLIC BLOOD PRESSURE: 140 MMHG | WEIGHT: 157.4 LBS | HEIGHT: 63 IN | DIASTOLIC BLOOD PRESSURE: 83 MMHG | HEART RATE: 83 BPM | BODY MASS INDEX: 27.89 KG/M2

## 2025-04-17 DIAGNOSIS — R10.13 EPIGASTRIC PAIN: ICD-10-CM

## 2025-04-17 DIAGNOSIS — K21.9 GASTROESOPHAGEAL REFLUX DISEASE, UNSPECIFIED WHETHER ESOPHAGITIS PRESENT: Primary | ICD-10-CM

## 2025-04-17 DIAGNOSIS — R13.19 ESOPHAGEAL DYSPHAGIA: ICD-10-CM

## 2025-04-17 DIAGNOSIS — K58.2 IRRITABLE BOWEL SYNDROME WITH BOTH CONSTIPATION AND DIARRHEA: ICD-10-CM

## 2025-04-17 DIAGNOSIS — D50.9 IRON DEFICIENCY ANEMIA, UNSPECIFIED IRON DEFICIENCY ANEMIA TYPE: ICD-10-CM

## 2025-04-17 PROCEDURE — 83550 IRON BINDING TEST: CPT

## 2025-04-17 PROCEDURE — 83540 ASSAY OF IRON: CPT

## 2025-04-17 PROCEDURE — 85025 COMPLETE CBC W/AUTO DIFF WBC: CPT

## 2025-04-17 PROCEDURE — 82728 ASSAY OF FERRITIN: CPT

## 2025-04-17 RX ORDER — CALCIUM POLYCARBOPHIL 625 MG
625 TABLET ORAL 2 TIMES DAILY
Qty: 60 TABLET | Refills: 5 | Status: SHIPPED | OUTPATIENT
Start: 2025-04-17

## 2025-04-17 RX ORDER — PANTOPRAZOLE SODIUM 40 MG/1
40 TABLET, DELAYED RELEASE ORAL
Qty: 60 TABLET | Refills: 5 | Status: SHIPPED | OUTPATIENT
Start: 2025-04-17

## 2025-04-17 NOTE — PROGRESS NOTES
"Date of Consultation:  4/17/2025  Referring Physician: Katheryn Bass DO    Chief Complaint  GERD    Rekha Jha is a 60 y.o. female who presents today to [facility] for GERD.    HPI:   60-year-old female who presents today for evaluation of GERD.  Patient states that for the last 5-6 years, she has had severe acid reflux.  Patient notes vomiting stomach acid multiple times per week.  States the vomitus consist of clear, foamy, thin liquid.  States that this typically happens after eating.  She has noticed that this is worse with eating fatty, fried, greasy, and spicy foods.  She has significantly reduced her intake at these.  In the past, patient was drinking 5-6 cans of Coca-Cola per day.  She has reduced this to 1 can per day.  She does not consume any other caffeine.  She is currently on Protonix 40 mg daily and Nexium daily.  She notes having epigastric tenderness.  States that this feels like she is \"constantly full all the time.\"  She has some mild lower abdominal cramping as well.  She notes some dysphagia to onions and tomatoes.  Patient feels like she has issues in her upper esophagus with swallowing.  Patient has had issues with choking the past but not in recent months.  In the past, patient was on Ozempic for short period of time.  She notes currently having 2 bowel movements per day that she rates as BSS 1-6.  She has poor evacuation of her colon and excessive straining sometimes.  She denies unintentional weight loss, nausea, vomiting, melena, or hematochezia.    Of note, patient reports history of anemia.  Most recent labs available for my review collected in 2016.  This is notable for H&H 9.4/32.0 with MCV 77.7.  Patient states that she has been unable to tolerate oral iron supplementation in the past.  States that she has been anemic on recent labs.  However, I do not have these labs available for review.  Patient had colonoscopy performed " by Dr. Schulte in 2023.  This was notable for multiple small and large mouth diverticula, nonbleeding internal hemorrhoids, otherwise unremarkable.  Repeat colonoscopy recommended in 10 years for screening purposes.               Previous History:   Past Medical History:   Diagnosis Date    Anemia     Cholecystitis     Degenerative disc disease, lumbar     Hearing impaired     Heart palpitations     Liver cyst     Patient states she hasnt been told    Ovarian cyst     PONV (postoperative nausea and vomiting)     Seizures     last time      Sleep apnea     Umbilical hernia       Past Surgical History:   Procedure Laterality Date    ABDOMINAL SURGERY      BREAST SURGERY      cyst removal left breast     SECTION       SECTION WITH TUBAL      COLONOSCOPY N/A 2023    Procedure: COLONOSCOPY;  Surgeon: Domenic Schulte MD;  Location: [facility];  Service: Gastroenterology;  Laterality: N/A;    MT LAPAROSCOPY SURG CHOLECYSTECTOMY N/A 2016    Procedure: CHOLECYSTECTOMY LAPAROSCOPIC;  Surgeon: Benjamin Leon MD;  Location: [facility];  Service: General    SALIVARY GLAND EXCISION      TONSILLECTOMY        Social History     Socioeconomic History    Marital status:    Tobacco Use    Smoking status: Every Day     Current packs/day: 0.25     Average packs/day: 0.3 packs/day for 40.0 years (10.0 ttl pk-yrs)     Types: Cigarettes     Passive exposure: Current    Smokeless tobacco: Never    Tobacco comments:     Down to a third of a pack per day    Vaping Use    Vaping status: Never Used   Substance and Sexual Activity    Alcohol use: No     Comment: socially     Drug use: No    Sexual activity: Defer        Current Medications:  Current Outpatient Medications   Medication Sig Dispense Refill    celecoxib (CeleBREX) 200 MG capsule       cetirizine (zyrTEC) 10 MG tablet       cyclobenzaprine (FLEXERIL) 10 MG tablet       DULoxetine (CYMBALTA) 30 MG capsule       fluticasone (FLONASE)  "50 MCG/ACT nasal spray       gabapentin (NEURONTIN) 800 MG tablet Take 1 tablet by mouth Daily.      montelukast (SINGULAIR) 10 MG tablet Daily.      pantoprazole (PROTONIX) 40 MG EC tablet Take 1 tablet by mouth 2 (Two) Times a Day Before Meals. 60 tablet 5    SALINE MIST 0.65 % nasal spray       venlafaxine XR (EFFEXOR-XR) 37.5 MG 24 hr capsule       polycarbophil (calcium polycarbophil) 625 MG tablet tablet Take 1 tablet by mouth 2 (Two) Times a Day. 60 tablet 5     No current facility-administered medications for this visit.       Allergies:   Allergies   Allergen Reactions    Ciprofloxacin Hcl Hives       Vitals:   /83   Pulse 83   Ht 158.8 cm (62.5\")   Wt 71.4 kg (157 lb 6.4 oz)   LMP 07/28/2016 (Approximate)   BMI 28.33 kg/m²   Estimated body mass index is 28.33 kg/m² as calculated from the following:    Height as of this encounter: 158.8 cm (62.5\").    Weight as of this encounter: 71.4 kg (157 lb 6.4 oz).    Rekha Jha  reports that she has been smoking cigarettes. She has a 10 pack-year smoking history. She has been exposed to tobacco smoke. She has never used smokeless tobacco. I have educated her on the risk of diseases from using tobacco products such as cancer, COPD, and heart disease.       ROS:   Review of Systems   Constitutional: Negative.    HENT: Negative.     Respiratory: Negative.     Cardiovascular: Negative.    Gastrointestinal:  Positive for abdominal pain, constipation, diarrhea, nausea and vomiting. Negative for abdominal distention, anal bleeding, blood in stool and rectal pain.   All other systems reviewed and are negative.       Physical Exam:   Physical Exam  Vitals reviewed.   Constitutional:       General: She is not in acute distress.     Appearance: Normal appearance. She is well-groomed. She is not toxic-appearing.   HENT:      Head: Normocephalic and atraumatic.      Mouth/Throat:      Mouth: Mucous membranes are moist.   Eyes:      Extraocular Movements: " Extraocular movements intact.   Cardiovascular:      Rate and Rhythm: Normal rate and regular rhythm.      Heart sounds: Normal heart sounds. No murmur heard.  Pulmonary:      Effort: Pulmonary effort is normal. No respiratory distress.      Breath sounds: Normal breath sounds. No stridor. No wheezing or rales.   Abdominal:      General: Bowel sounds are normal. There is no distension.      Palpations: Abdomen is soft. There is no mass.      Tenderness: There is no abdominal tenderness. There is no guarding or rebound.      Hernia: No hernia is present.   Skin:     General: Skin is warm.      Coloration: Skin is not jaundiced.      Findings: No rash.   Neurological:      Mental Status: She is alert and oriented to person, place, and time.   Psychiatric:         Mood and Affect: Mood and affect normal.         Speech: Speech normal.         Behavior: Behavior normal. Behavior is cooperative.         Thought Content: Thought content normal.          Lab Results:   Lab Results   Component Value Date    WBC 6.51 08/16/2016    HGB 9.4 (L) 08/16/2016    HCT 32.0 (L) 08/16/2016    MCV 77.7 (L) 08/16/2016    RDW 18.1 (H) 08/16/2016     08/16/2016    NEUTRORELPCT 61.6 08/16/2016    LYMPHORELPCT 27.3 08/16/2016    MONORELPCT 8.3 08/16/2016    EOSRELPCT 2.3 08/16/2016    BASORELPCT 0.3 08/16/2016    NEUTROABS 4.01 08/16/2016    LYMPHSABS 1.78 08/16/2016       Lab Results   Component Value Date     08/16/2016    K 4.3 08/16/2016    CO2 29.0 08/16/2016     08/16/2016    BUN 14 08/16/2016    CREATININE 0.69 08/16/2016    EGFRIFNONA 90 08/16/2016    GLUCOSE 93 08/16/2016    CALCIUM 8.9 08/16/2016    ALKPHOS 63 08/16/2016    AST 27 08/16/2016    ALT 22 08/16/2016    BILITOT 0.4 08/16/2016    ALBUMIN 4.10 08/16/2016    PROTEINTOT 7.0 08/16/2016       Pathology:        Endoscopy:        Imaging:   US Head Neck Soft Tissue  Result Date: 3/5/2025    Right neck probable lymph node measuring about 1 cm.  This report  was finalized on 3/5/2025 9:21 AM by Dr. Fredi Ibrahim MD.      XR Wrist 3+ View Bilateral  Result Date: 12/23/2024  As Above.      This report was finalized on 12/23/2024 10:58 AM by Dr. Fredi Ibrahim MD.          Results review: During today's encounter, all relevant clinical data has been reviewed.      Assessment and Plan    1. Gastroesophageal reflux disease, unspecified whether esophagitis present (Primary)  2.  Esophageal dysphagia  Discussed management for GERD: encouraged weight loss, HOB elevation at night, avoidance of meals 2-3 hours before bedtime, avoid trigger foods (caffeine, alcohol, chocolate, acidic/spicy foods e.g oranges/tomatoes), and encouraged smoking cessation  Will obtain esophagram due to intermittent dysphagia.  -     FL ESOPHAGRAM SINGLE CONTRAST; Future  Will initiate patient on Protonix 40 mg twice daily.  -     pantoprazole (PROTONIX) 40 MG EC tablet; Take 1 tablet by mouth 2 (Two) Times a Day Before Meals.  Dispense: 60 tablet; Refill: 5    3. Epigastric pain  -     H. Pylori Antigen, Stool - Stool, Per Rectum    4. Iron deficiency anemia, unspecified iron deficiency anemia type  Given history of DEWEY, will obtain the following lab work.  If patient continues to be DEWEY, will consider proceeding with EGD and initiating on IV iron infusions given intolerance of oral iron in the past.  -     CBC & Differential  -     Iron Profile  -     Ferritin  -     Comprehensive Metabolic Panel; Future    5. Irritable bowel syndrome with both constipation and diarrhea  -     polycarbophil (calcium polycarbophil) 625 MG tablet tablet; Take 1 tablet by mouth 2 (Two) Times a Day.  Dispense: 60 tablet; Refill: 5    New Medications:   New Medications Ordered This Visit   Medications    pantoprazole (PROTONIX) 40 MG EC tablet     Sig: Take 1 tablet by mouth 2 (Two) Times a Day Before Meals.     Dispense:  60 tablet     Refill:  5    polycarbophil (calcium polycarbophil) 625 MG tablet tablet     Sig: Take 1  tablet by mouth 2 (Two) Times a Day.     Dispense:  60 tablet     Refill:  5       Discontinued Medications:   Medications Discontinued During This Encounter   Medication Reason    esomeprazole (nexIUM) 20 MG capsule Not Efficacious    pantoprazole (PROTONIX) 40 MG EC tablet Reorder        Visit Diagnoses:    ICD-10-CM ICD-9-CM   1. Gastroesophageal reflux disease, unspecified whether esophagitis present  K21.9 530.81   2. Irritable bowel syndrome with both constipation and diarrhea  K58.2 564.1   3. Epigastric pain  R10.13 789.06   4. Iron deficiency anemia, unspecified iron deficiency anemia type  D50.9 280.9   5. Esophageal dysphagia  R13.19 787.29            Follow Up:   Return in about 3 months (around 7/17/2025).    The patient was in agreement with the plan and all questions were answered to patient's satisfaction.        This document has been electronically signed by Sandy Almanza PA-C   April 17, 2025 13:58 EDT    Dictated Utilizing Dragon Dictation: Part of this note may be an electronic transcription/translation of spoken language to printed text using the Dragon Dictation System.    CC:  DO Dru Craig Cassie A, DO

## 2025-04-18 LAB
BASOPHILS # BLD AUTO: 0 X10E3/UL (ref 0–0.2)
BASOPHILS NFR BLD AUTO: 0 %
EOSINOPHIL # BLD AUTO: 0.1 X10E3/UL (ref 0–0.4)
EOSINOPHIL NFR BLD AUTO: 2 %
ERYTHROCYTE [DISTWIDTH] IN BLOOD BY AUTOMATED COUNT: 12.7 % (ref 11.7–15.4)
FERRITIN SERPL-MCNC: 40 NG/ML (ref 15–150)
HCT VFR BLD AUTO: 46.2 % (ref 34–46.6)
HGB BLD-MCNC: 15.7 G/DL (ref 11.1–15.9)
IMM GRANULOCYTES # BLD AUTO: 0 X10E3/UL (ref 0–0.1)
IMM GRANULOCYTES NFR BLD AUTO: 0 %
IRON SATN MFR SERPL: 23 % (ref 15–55)
IRON SERPL-MCNC: 84 UG/DL (ref 27–159)
LYMPHOCYTES # BLD AUTO: 1.8 X10E3/UL (ref 0.7–3.1)
LYMPHOCYTES NFR BLD AUTO: 29 %
MCH RBC QN AUTO: 33.8 PG (ref 26.6–33)
MCHC RBC AUTO-ENTMCNC: 34 G/DL (ref 31.5–35.7)
MCV RBC AUTO: 99 FL (ref 79–97)
MONOCYTES # BLD AUTO: 0.5 X10E3/UL (ref 0.1–0.9)
MONOCYTES NFR BLD AUTO: 7 %
NEUTROPHILS # BLD AUTO: 3.8 X10E3/UL (ref 1.4–7)
NEUTROPHILS NFR BLD AUTO: 62 %
PLATELET # BLD AUTO: 284 X10E3/UL (ref 150–450)
RBC # BLD AUTO: 4.65 X10E6/UL (ref 3.77–5.28)
TIBC SERPL-MCNC: 362 UG/DL (ref 250–450)
UIBC SERPL-MCNC: 278 UG/DL (ref 131–425)
WBC # BLD AUTO: 6.3 X10E3/UL (ref 3.4–10.8)

## 2025-04-23 ENCOUNTER — RESULTS FOLLOW-UP (OUTPATIENT)
Dept: GASTROENTEROLOGY | Facility: CLINIC | Age: 60
End: 2025-04-23
Payer: MEDICAID

## 2025-05-16 ENCOUNTER — OFFICE VISIT (OUTPATIENT)
Dept: PULMONOLOGY | Facility: CLINIC | Age: 60
End: 2025-05-16
Payer: MEDICAID

## 2025-05-16 VITALS
HEIGHT: 63 IN | WEIGHT: 153.6 LBS | SYSTOLIC BLOOD PRESSURE: 124 MMHG | HEART RATE: 76 BPM | DIASTOLIC BLOOD PRESSURE: 76 MMHG | TEMPERATURE: 96.8 F | OXYGEN SATURATION: 95 % | BODY MASS INDEX: 27.21 KG/M2

## 2025-05-16 DIAGNOSIS — G47.33 OSA (OBSTRUCTIVE SLEEP APNEA): Primary | ICD-10-CM

## 2025-05-16 DIAGNOSIS — F17.200 SMOKER: ICD-10-CM

## 2025-05-16 PROCEDURE — 99213 OFFICE O/P EST LOW 20 MIN: CPT | Performed by: NURSE PRACTITIONER

## 2025-05-16 PROCEDURE — 1159F MED LIST DOCD IN RCRD: CPT | Performed by: NURSE PRACTITIONER

## 2025-05-16 PROCEDURE — 1160F RVW MEDS BY RX/DR IN RCRD: CPT | Performed by: NURSE PRACTITIONER

## 2025-05-16 NOTE — PROGRESS NOTES
"Chief Complaint  Sleep Apnea    Subjective          Rekha Jha presents to [facility] for   History of Present Illness    Ms. Jha is a 60-year-old female with a medical history significant for anemia, seizures and sleep apnea.    She presents today for follow-up on sleep apnea.  She reports that she is using her CPAP nightly and voices no complaints.  She notes benefit from use.    Objective   Vital Signs:   /76   Pulse 76   Temp 96.8 °F (36 °C)   Ht 158.8 cm (62.52\")   Wt 69.7 kg (153 lb 9.6 oz)   SpO2 95%   BMI 27.63 kg/m²         Physical Exam    GENERAL APPEARANCE: Well developed, well nourished, alert and cooperative, and appears to be in no acute distress.    HEAD: normocephalic. Atraumatic.    EYES: PERRL, EOMI. Vision is grossly intact.    THROAT: Oral cavity and pharynx normal. No inflammation, swelling, exudate, or lesions.     NECK: Neck supple.  No thyromegaly.    CARDIAC: Normal S1 and S2. No S3, S4 or murmurs. Rhythm is regular.     RESPIRATORY:Bilateral air entry positive.  No wheezing, crackles or rhonchi noted.    GI: Positive bowel sounds. Soft, nondistended, nontender.     MUSCULOSKELETAL: No significant deformity or joint abnormality. No edema. Peripheral pulses intact. No varicosities.    NEUROLOGICAL: Strength and sensation symmetric and intact throughout.     PSYCHIATRIC: The mental examination revealed the patient was oriented to person, place, and time.       Estimated body mass index is 27.63 kg/m² as calculated from the following:    Height as of this encounter: 158.8 cm (62.52\").    Weight as of this encounter: 69.7 kg (153 lb 9.6 oz).        Result Review :   The following data was reviewed by: VITA Chun on 05/16/2025:  Common labs          4/17/2025    09:07   Common Labs   WBC 6.3    Hemoglobin 15.7    Hematocrit 46.2    Platelets 284           PFT:NA    Low dose lung cancer " "screening:NA    Previous chest imaging:NA    Alpha-1 antitrypsin screening:NA    STOP-Bang Score:   NA  [place] Sleepiness Scale:   NA      ABG:    pH No results found for: \"PHART\"   pO2 No results found for: \"PO2ART\"   pCO2 No results found for: \"CQI9BLP\"   HCO3 No results found for: \"UMC0CKC\"                      Assessment and Plan    Problem List Items Addressed This Visit    None  Visit Diagnoses         JASON (obstructive sleep apnea)    -  Primary      Smoker            Rekha Jha  reports that she has been smoking cigarettes. She has a 10 pack-year smoking history. She has been exposed to tobacco smoke. She has never used smokeless tobacco. I have educated her on the risk of diseases from using tobacco products such as cancer, COPD, and heart disease.     I advised her to quit and she is not willing to quit.      Sleep study is notable for mild sleep apnea.  AHI is 7.7.  She is compliant with use of CPAP nightly.  She reports that she is doing better with wearing her mask all night and notes benefit from use.  Compliance report was reviewed.       Patient was educated on positive airway pressure treatment.  As per CMS guidelines, more than 4 hours on 70% of observed nights is considered adherence. Patient was strongly encouraged to use CPAP as much as possible during sleep as more CPAP use is equal to more benefit. Use of heated humidification in positive airway pressure treatment to improve the adherence to the device.  In case of claustrophobia, we will provide the patient cognitive behavioral therapy and desensitization. Oral appliances use will be discussed with the patient in case of mild to moderate sleep apnea or if the patient with severe disease fail positive airway pressure treatment.       The patient was extensively educated on the consequences of untreated obstructive sleep apnea namely cardiovascular/metabolic disorder, neurocognitive deficit, daytime sleepiness, motor vehicle accidents, " depression, mood disorders and reduced quality of life.  At the end of conversation, the patient voices understanding of the disease process and treatment modality.  Patient also understands the risk of untreated obstructive sleep apnea and benefit benefits of the treatment.    Counseling time was greater than 10 minutes.        Follow Up   Return in about 1 year (around 5/16/2026).  Patient was given instructions and counseling regarding her condition or for health maintenance advice. Please see specific information pulled into the AVS if appropriate.

## 2025-07-03 ENCOUNTER — HOSPITAL ENCOUNTER (EMERGENCY)
Facility: HOSPITAL | Age: 60
Discharge: HOME | End: 2025-07-03
Attending: STUDENT IN AN ORGANIZED HEALTH CARE EDUCATION/TRAINING PROGRAM
Payer: MEDICAID

## 2025-07-03 VITALS
BODY MASS INDEX: 26.26 KG/M2 | HEIGHT: 63 IN | TEMPERATURE: 98 F | WEIGHT: 148.2 LBS | DIASTOLIC BLOOD PRESSURE: 77 MMHG | OXYGEN SATURATION: 94 % | RESPIRATION RATE: 17 BRPM | HEART RATE: 78 BPM | SYSTOLIC BLOOD PRESSURE: 110 MMHG

## 2025-07-03 DIAGNOSIS — M79.89 LEG SWELLING: Primary | ICD-10-CM

## 2025-07-03 LAB
ALBUMIN SERPL-MCNC: 4.1 G/DL (ref 3.5–5.2)
ALBUMIN/GLOB SERPL: 1.6 G/DL
ALP SERPL-CCNC: 121 U/L (ref 39–117)
ALT SERPL W P-5'-P-CCNC: 22 U/L (ref 1–33)
ANION GAP SERPL CALCULATED.3IONS-SCNC: 9.1 MMOL/L (ref 5–15)
AST SERPL-CCNC: 25 U/L (ref 1–32)
BASOPHILS # BLD AUTO: 0.02 10*3/MM3 (ref 0–0.2)
BASOPHILS NFR BLD AUTO: 0.2 % (ref 0–1.5)
BILIRUB SERPL-MCNC: 0.4 MG/DL (ref 0–1.2)
BUN SERPL-MCNC: 14.6 MG/DL (ref 6–20)
BUN/CREAT SERPL: 23.9 (ref 7–25)
CALCIUM SPEC-SCNC: 9.2 MG/DL (ref 8.6–10.5)
CHLORIDE SERPL-SCNC: 107 MMOL/L (ref 98–107)
CO2 SERPL-SCNC: 26.9 MMOL/L (ref 22–29)
CREAT SERPL-MCNC: 0.61 MG/DL (ref 0.57–1)
CRP SERPL-MCNC: <0.3 MG/DL (ref 0–0.5)
DEPRECATED RDW RBC AUTO: 52.4 FL (ref 37–54)
EGFRCR SERPLBLD CKD-EPI 2021: 103.8 ML/MIN/1.73
EOSINOPHIL # BLD AUTO: 0.14 10*3/MM3 (ref 0–0.4)
EOSINOPHIL NFR BLD AUTO: 1.5 % (ref 0.3–6.2)
ERYTHROCYTE [DISTWIDTH] IN BLOOD BY AUTOMATED COUNT: 13.6 % (ref 12.3–15.4)
GLOBULIN UR ELPH-MCNC: 2.5 GM/DL
GLUCOSE SERPL-MCNC: 90 MG/DL (ref 65–99)
HCT VFR BLD AUTO: 47.8 % (ref 34–46.6)
HGB BLD-MCNC: 15.5 G/DL (ref 12–15.9)
HOLD SPECIMEN: NORMAL
HOLD SPECIMEN: NORMAL
IMM GRANULOCYTES # BLD AUTO: 0.04 10*3/MM3 (ref 0–0.05)
IMM GRANULOCYTES NFR BLD AUTO: 0.4 % (ref 0–0.5)
LYMPHOCYTES # BLD AUTO: 2 10*3/MM3 (ref 0.7–3.1)
LYMPHOCYTES NFR BLD AUTO: 21.8 % (ref 19.6–45.3)
MCH RBC QN AUTO: 33.8 PG (ref 26.6–33)
MCHC RBC AUTO-ENTMCNC: 32.4 G/DL (ref 31.5–35.7)
MCV RBC AUTO: 104.4 FL (ref 79–97)
MONOCYTES # BLD AUTO: 0.49 10*3/MM3 (ref 0.1–0.9)
MONOCYTES NFR BLD AUTO: 5.3 % (ref 5–12)
NEUTROPHILS NFR BLD AUTO: 6.5 10*3/MM3 (ref 1.7–7)
NEUTROPHILS NFR BLD AUTO: 70.8 % (ref 42.7–76)
NRBC BLD AUTO-RTO: 0 /100 WBC (ref 0–0.2)
NT-PROBNP SERPL-MCNC: <36 PG/ML (ref 0–900)
PLATELET # BLD AUTO: 299 10*3/MM3 (ref 140–450)
PMV BLD AUTO: 10 FL (ref 6–12)
POTASSIUM SERPL-SCNC: 4.5 MMOL/L (ref 3.5–5.2)
PROT SERPL-MCNC: 6.6 G/DL (ref 6–8.5)
RBC # BLD AUTO: 4.58 10*6/MM3 (ref 3.77–5.28)
SODIUM SERPL-SCNC: 143 MMOL/L (ref 136–145)
WBC NRBC COR # BLD AUTO: 9.19 10*3/MM3 (ref 3.4–10.8)
WHOLE BLOOD HOLD COAG: NORMAL
WHOLE BLOOD HOLD SPECIMEN: NORMAL

## 2025-07-03 PROCEDURE — 86140 C-REACTIVE PROTEIN: CPT

## 2025-07-03 PROCEDURE — 85025 COMPLETE CBC W/AUTO DIFF WBC: CPT

## 2025-07-03 PROCEDURE — 80053 COMPREHEN METABOLIC PANEL: CPT

## 2025-07-03 PROCEDURE — 36415 COLL VENOUS BLD VENIPUNCTURE: CPT

## 2025-07-03 PROCEDURE — 83880 ASSAY OF NATRIURETIC PEPTIDE: CPT

## 2025-07-03 PROCEDURE — 99283 EMERGENCY DEPT VISIT LOW MDM: CPT

## 2025-07-03 RX ORDER — METHYLPREDNISOLONE 4 MG/1
TABLET ORAL
Qty: 21 TABLET | Refills: 0 | Status: SHIPPED | OUTPATIENT
Start: 2025-07-03

## 2025-07-03 RX ORDER — FUROSEMIDE 20 MG/1
20 TABLET ORAL DAILY
Qty: 3 TABLET | Refills: 0 | Status: SHIPPED | OUTPATIENT
Start: 2025-07-03

## 2025-07-03 NOTE — ED NOTES
MEDICAL SCREENING:    Reason for Visit: bilateral leg swelling x 1 week    Patient initially seen in triage.  The patient was advised further evaluation and diagnostic testing will be needed, some of the treatment and testing will be initiated in the lobby in order to begin the process.  The patient will be returned to the waiting area for the time being and possibly be re-assessed by a subsequent ED provider.  The patient will be brought back to the treatment area in as timely manner as possible.       Kathie Otero, PADadaC  07/03/25 1404

## 2025-07-03 NOTE — ED PROVIDER NOTES
Subjective   History of Present Illness  60-year-old female with past medical history of anemia, degenerative disc disease, seizures, and sleep apnea presents to the emergency room with bilateral lower extremity swelling.  Patient states a few days ago she was moving furniture and since that time has noticed lower extremity swelling.  She also states that she thinks that she injured her ribs on the right therefore went to urgent care and had an x-ray and a Toradol shot and states she received another Toradol shot at the primary care physician's office yesterday and since that time has noticed increasing swelling in her ankles.  She states that she thinks that she may be having an allergic reaction however cannot be for certain.  She denies any history of congestive heart failure and denies any use of diuretics.  She denies any specific aggravating or alleviating factors.  Denies any other complaints or concerns at this time.    History provided by:  Patient   used: No        Review of Systems   Constitutional: Negative.  Negative for fever.   HENT: Negative.  Negative for trouble swallowing.    Respiratory: Negative.  Negative for shortness of breath.    Cardiovascular:  Positive for leg swelling. Negative for chest pain.   Gastrointestinal: Negative.  Negative for abdominal pain.   Endocrine: Negative.    Genitourinary: Negative.  Negative for dysuria.   Skin: Negative.  Negative for rash.   Neurological: Negative.    Psychiatric/Behavioral: Negative.     All other systems reviewed and are negative.      Past Medical History:   Diagnosis Date    Anemia     Cholecystitis     Degenerative disc disease, lumbar     Hearing impaired     Heart palpitations     Liver cyst     Patient states she hasnt been told    Ovarian cyst     PONV (postoperative nausea and vomiting)     Seizures     last time  2006    Sleep apnea     Umbilical hernia        Allergies   Allergen Reactions    Ciprofloxacin Hcl Hives        Past Surgical History:   Procedure Laterality Date    ABDOMINAL SURGERY      BREAST SURGERY      cyst removal left breast     SECTION       SECTION WITH TUBAL      COLONOSCOPY N/A 2023    Procedure: COLONOSCOPY;  Surgeon: Domenic Schulte MD;  Location:  [facility];  Service: Gastroenterology;  Laterality: N/A;    AK LAPAROSCOPY SURG CHOLECYSTECTOMY N/A 2016    Procedure: CHOLECYSTECTOMY LAPAROSCOPIC;  Surgeon: Benjamin Leon MD;  Location:  [facility];  Service: General    SALIVARY GLAND EXCISION      TONSILLECTOMY         Family History   Adopted: Yes   Problem Relation Age of Onset    Hypertension Mother     Cancer Maternal Aunt     Cancer Maternal Grandmother        Social History     Socioeconomic History    Marital status:    Tobacco Use    Smoking status: Every Day     Current packs/day: 0.25     Average packs/day: 0.3 packs/day for 40.0 years (10.0 ttl pk-yrs)     Types: Cigarettes     Passive exposure: Current    Smokeless tobacco: Never    Tobacco comments:     Down to a third of a pack per day    Vaping Use    Vaping status: Never Used   Substance and Sexual Activity    Alcohol use: No     Comment: socially     Drug use: No    Sexual activity: Defer           Objective   Physical Exam  Vitals and nursing note reviewed.   Constitutional:       General: She is not in acute distress.     Appearance: She is well-developed. She is not diaphoretic.   HENT:      Head: Normocephalic and atraumatic.      Right Ear: External ear normal.      Left Ear: External ear normal.      Nose: Nose normal.   Eyes:      Conjunctiva/sclera: Conjunctivae normal.      Pupils: Pupils are equal, round, and reactive to light.   Neck:      Vascular: No JVD.      Trachea: No tracheal deviation.   Cardiovascular:      Rate and Rhythm: Normal rate and regular rhythm.      Heart sounds: Normal heart sounds. No murmur heard.  Pulmonary:      Effort: Pulmonary effort is normal. No respiratory  distress.      Breath sounds: Normal breath sounds. No wheezing.   Abdominal:      General: Bowel sounds are normal.      Palpations: Abdomen is soft.      Tenderness: There is no abdominal tenderness.   Musculoskeletal:         General: No deformity. Normal range of motion.      Cervical back: Normal range of motion and neck supple.      Right lower le+ Edema present.      Left lower le+ Edema present.   Skin:     General: Skin is warm and dry.      Coloration: Skin is not pale.      Findings: No erythema or rash.   Neurological:      Mental Status: She is alert and oriented to person, place, and time.      Cranial Nerves: No cranial nerve deficit.   Psychiatric:         Behavior: Behavior normal.         Thought Content: Thought content normal.         Procedures           ED Course        Results for orders placed or performed during the hospital encounter of 25   Comprehensive Metabolic Panel    Collection Time: 25  2:56 PM    Specimen: Blood   Result Value Ref Range    Glucose 90 65 - 99 mg/dL    BUN 14.6 6.0 - 20.0 mg/dL    Creatinine 0.61 0.57 - 1.00 mg/dL    Sodium 143 136 - 145 mmol/L    Potassium 4.5 3.5 - 5.2 mmol/L    Chloride 107 98 - 107 mmol/L    CO2 26.9 22.0 - 29.0 mmol/L    Calcium 9.2 8.6 - 10.5 mg/dL    Total Protein 6.6 6.0 - 8.5 g/dL    Albumin 4.1 3.5 - 5.2 g/dL    ALT (SGPT) 22 1 - 33 U/L    AST (SGOT) 25 1 - 32 U/L    Alkaline Phosphatase 121 (H) 39 - 117 U/L    Total Bilirubin 0.4 0.0 - 1.2 mg/dL    Globulin 2.5 gm/dL    A/G Ratio 1.6 g/dL    BUN/Creatinine Ratio 23.9 7.0 - 25.0    Anion Gap 9.1 5.0 - 15.0 mmol/L    eGFR 103.8 >60.0 mL/min/1.73   C-reactive Protein    Collection Time: 25  2:56 PM    Specimen: Blood   Result Value Ref Range    C-Reactive Protein <0.30 0.00 - 0.50 mg/dL   BNP    Collection Time: 25  2:56 PM    Specimen: Blood   Result Value Ref Range    proBNP <36.0 0.0 - 900.0 pg/mL   CBC Auto Differential    Collection Time: 25  2:56 PM     Specimen: Blood   Result Value Ref Range    WBC 9.19 3.40 - 10.80 10*3/mm3    RBC 4.58 3.77 - 5.28 10*6/mm3    Hemoglobin 15.5 12.0 - 15.9 g/dL    Hematocrit 47.8 (H) 34.0 - 46.6 %    .4 (H) 79.0 - 97.0 fL    MCH 33.8 (H) 26.6 - 33.0 pg    MCHC 32.4 31.5 - 35.7 g/dL    RDW 13.6 12.3 - 15.4 %    RDW-SD 52.4 37.0 - 54.0 fl    MPV 10.0 6.0 - 12.0 fL    Platelets 299 140 - 450 10*3/mm3    Neutrophil % 70.8 42.7 - 76.0 %    Lymphocyte % 21.8 19.6 - 45.3 %    Monocyte % 5.3 5.0 - 12.0 %    Eosinophil % 1.5 0.3 - 6.2 %    Basophil % 0.2 0.0 - 1.5 %    Immature Grans % 0.4 0.0 - 0.5 %    Neutrophils, Absolute 6.50 1.70 - 7.00 10*3/mm3    Lymphocytes, Absolute 2.00 0.70 - 3.10 10*3/mm3    Monocytes, Absolute 0.49 0.10 - 0.90 10*3/mm3    Eosinophils, Absolute 0.14 0.00 - 0.40 10*3/mm3    Basophils, Absolute 0.02 0.00 - 0.20 10*3/mm3    Immature Grans, Absolute 0.04 0.00 - 0.05 10*3/mm3    nRBC 0.0 0.0 - 0.2 /100 WBC   Green Top (Gel)    Collection Time: 07/03/25  2:56 PM   Result Value Ref Range    Extra Tube Hold for add-ons.    Lavender Top    Collection Time: 07/03/25  2:56 PM   Result Value Ref Range    Extra Tube hold for add-on    Gold Top - SST    Collection Time: 07/03/25  2:56 PM   Result Value Ref Range    Extra Tube Hold for add-ons.    Light Blue Top    Collection Time: 07/03/25  2:56 PM   Result Value Ref Range    Extra Tube Hold for add-ons.                                                       Medical Decision Making  - Uncomplicated ED course.  -Patient remained hemodynamically stable throughout entire duration of ED course.  -Patient's labs unremarkable.  -Patient to be discharged in stable condition with a prescription for Lasix x 3 days, as well as a Medrol Dosepak.  Patient has also been encouraged to elevate legs when at rest.  -Patient encouraged to return to the emergency room with new or worsening symptomatology.    Problems Addressed:  Leg swelling: complicated acute illness or  injury    Risk  Prescription drug management.        Final diagnoses:   Leg swelling       ED Disposition  ED Disposition       ED Disposition   Discharge    Condition   Stable    Comment   --               Katheryn Taylor, DO  [address] [facility] [address]  [facility] [id number] [phone]    In 2 days           Medication List        New Prescriptions      furosemide 20 MG tablet  Commonly known as: LASIX  Take 1 tablet by mouth Daily.     methylPREDNISolone 4 MG dose pack  Commonly known as: MEDROL  Take as directed on package instructions.               Where to Get Your Medications        These medications were sent to [facility] [id number] - [place] [address] - [phone] [facility] [phone] [address] [address]      Phone: [phone]   furosemide 20 MG tablet  methylPREDNISolone 4 MG dose pack            Kathie Otero PADadaC  07/03/25 6627

## (undated) DEVICE — ENDOGATOR AUXILIARY WATER JET CONNECTOR: Brand: ENDOGATOR

## (undated) DEVICE — Device: Brand: DEFENDO AIR/WATER/SUCTION AND BIOPSY VALVE

## (undated) DEVICE — CONN Y IRR DISP 1P/U

## (undated) DEVICE — Device